# Patient Record
Sex: MALE | Race: WHITE
[De-identification: names, ages, dates, MRNs, and addresses within clinical notes are randomized per-mention and may not be internally consistent; named-entity substitution may affect disease eponyms.]

---

## 2019-01-19 NOTE — CT
CT CERVICAL SPINE:

 

Date:  01/19/19 

 

PROVIDED CLINICAL HISTORY:   

Injury. 

 

FINDINGS:

There is no evidence for fracture or traumatic subluxation. Advanced cervical degenerative changes ar
e seen with degenerative anterolisthesis of C3 on C4, and C7 on T1. No prevertebral soft tissue swell
ing apparent. Visualized lung apices appear clear. Carotid calcifications are seen. 

 

IMPRESSION: 

Advanced degenerative change without evidence for fracture or traumatic subluxation. 

 

 

POS: [facility]

## 2019-01-19 NOTE — CT
CT FACIAL BONES:

 

Date:  01/19/19 

 

PROVIDED CLINICAL HISTORY:   

Pain status post fall. 

 

FINDINGS:

Nondisplaced, mildly comminuted nasal bone fracture anteriorly right of midline. No additional fractu
re is evident. The paranasal sinuses demonstrate mild mucosal changes. The globes and other orbital c
ontents demonstrate no acute abnormality. No additional fracture is evident. 

 

IMPRESSION: 

Nasal bone fracture. 

 

 

POS: [facility]

## 2019-01-19 NOTE — HP
CHIEF COMPLAINT:  Confusion and imbalance.



HISTORY OF PRESENT ILLNESS:  The patient is a 78-year-old  male, who woke

up this morning and noticed that his balance is significantly off and he was

somewhat confused according to the family.  The family noticed that since

Thanksgiving, his mental function is kind of off and he has some confusion and this

is getting worse.  He did not have any numbness, tingling, weakness, headache, or

any other symptoms of stroke except for those above mentioned.  Apparently, he had a

stroke in the past couple of years ago, but he did not go to the doctor for that

problem since he said the doctor could not help him much, so he was brought to the

emergency room for further evaluation of this problem. 



PAST MEDICAL HISTORY:  

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

2. Hyperlipidemia.

3. Hypertension.



ALLERGIES:  NONE.



CURRENT MEDICATIONS:  Please refer to the medications list.



PAST SURGICAL HISTORY:  

1. Atrial fibrillation, status post ablation.

2. Left hip replacement.

3. Stenting of the descending aorta.



FAMILY HISTORY:  Father was 65 when he had lung cancer and mother  at the age of

88 of old age. 



SOCIAL HISTORY:  He used to smoke, but he quit a long time ago.  He drinks alcohol

daily, usually one scotch.  He denies any illicit drug use. 



REVIEW OF SYSTEMS:  All 14 systems were reviewed and they were negative except for

those symptoms mentioned in the HPI. 



PHYSICAL EXAMINATION:

GENERAL:  He is not in any distress during my visit. 

VITAL SIGNS:  His blood pressure is 170/104, pulse is 92, temperature is 97.6,

respiratory rate is 16, O2 saturation is 96% on room air. 

HEENT:  His head is posttraumatic.  He has a broken nose according to the CT of the

bony structures of the face.  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  The right eye shows

normal response to light.  Sclerae are nonicteric.  Conjunctivae reddish.  Oral

mucosa is moist. 

NECK:  Supple.  No lymphadenopathy. 

LUNGS:  Clear. 

HEART:  S1, S2, somewhat irregularly irregular.  No S3.  No S4. 

ABDOMEN:  Soft and nontender.  Bowel sounds are present.  No organomegaly. 

EXTREMITIES:  No clubbing, cyanosis, or edema. 

NEUROLOGICAL:  He follows my commands.  He is not in any distress.  There is no any

motor or sensory deficits present.  Cranial nerves are intact. 



LABORATORY DATA:  Labs showed white count of 9.2, hemoglobin 17.7, hematocrit 53.9.

INR 0.9.  PT 12.4, APTT 26.9.  Sodium 141, potassium 4.0, chloride 105, CO2 22, BUN

22, creatinine 1.18, calcium 10.7.  Troponin 0.011, second set 0.017, and the third

set is 0.031.  The rest of chemistry within normal limits.  Urinalysis showed 30 of

proteins and moderate amount of blood, 4-6 hyaline casts.  Brain CT showed no

evidence of intracranial hemorrhage or mass effect.  Chronic microvascular ischemic

changes.  The cervical spine CT showed advanced degenerative changes without

evidence of fracture or traumatic subluxation.  Chest x-ray did not show any

evidence of acute cardiopulmonary process. Facial bones CT showed nasal bone

fracture.  EKG showed atrial tachycardia.  No acute ischemic changes. 



IMPRESSION:  

1. Acute onset of imbalance, questionable cerebrovascular accident with normal CT so

far.  The patient received aspirin so far. 

2. Uncontrolled hypertension, most likely related to not being able to take his home

medications because of the current issue with his admission to the hospital. 

3. Atrial tachycardia.  We will consult Cardiology.  We will do echocardiogram.  We

will obtain MRI of the brain with and without contrast and carotid ultrasound. 



PLAN:  Plan is to admit him for observation.  Condition is fair.  Activity; bedrest

and bathroom privileges with assistance.  IV Hep-Lock.  We will repeat urinalysis

since he has some hematuria.  Current UA.  Get neurology consultation and reconcile

his home medications as soon as the list is available.  For now, we will continue

aspirin 325 mg daily and his surrogate decision maker is his son, Smith. 







Job ID:  [id number]

## 2019-01-19 NOTE — CT
CT BRAIN:

 

Date:  01/19/19 

 

PROVIDED CLINICAL HISTORY:   

Injury. 

 

FINDINGS:

 

Comparison with 02/10/16. 

 

The ventricular system appears normal in size and morphology. There is no evidence for intracranial h
emorrhage or mass effect. The chronic microvascular ischemic changes involving the cerebral white mat
ter appear similar to the prior study. The extracranial soft tissues and osseous structures demonstra
te an unremarkable CT appearance. 

 

IMPRESSION: 

No evidence for intracranial hemorrhage or mass effect. 

 

 

POS: [facility]

## 2019-01-19 NOTE — RAD
PORTABLE CHEST:

 

Date:  01/19/19

 

PROVIDED CLINICAL HISTORY:   

Weakness. 

 

FINDINGS:

 

No comparisons. 

 

The cardiac silhouette is within normal limits for portable technique. There is ectasia and tortuosit
y of the thoracic aorta with vascular calcification. No focal consolidation, pleural fluid, or pneumo
thorax apparent. 

 

IMPRESSION: 

No evidence for an acute cardiopulmonary process. 

 

 

POS: [facility]

## 2019-01-19 NOTE — ULT
BILATERAL CAROTID DUPLEX ULTRASOUND:

1/19/19

 

HISTORY: 

CVA.

 

FINDINGS:  

Gray scale, color flow, doppler evaluation, with spectral analysis of the bilateral carotid arteries 
is performed with 2D imaging. 

 

There is prominent calcified atherosclerotic plaque seen involving the proximal internal carotid nhung
zina bilaterally as well as the distal left common carotid artery. There is shadowing from the athero
sclerotic plaque which does limit evaluation of the proximal right internal carotid artery. Based on 
peak systolic velocity measurements in ICA/CCA ratios, there is less than 50% maximal stenosis in the
 bilateral internal carotid arteries. The peak systolic velocity in the right ICA is 77.6 cm/s with a
n ICA/CCA ratio of 0.97. Peak systolic velocity in the left ICA is 57 cm/s with an ICA/CCA ratio of 0
.54. 

 

Antegrade flow is demonstrated in the vertebral arteries bilaterally. 

 

IMPRESSION:  

No hemodynamically significant stenosis in the bilateral internal carotid arteries. However, there is
 shadowing atherosclerotic plaque involving the proximal right internal carotid artery which limits e
valuation. 

 

POS: [facility]

## 2019-01-20 NOTE — MRI
MRI BRAIN WITH AND WITHOUT IV CONTRAST:

 

Date:  01/20/19 

 

PROVIDED CLINICAL HISTORY:   

CVA, generalized weakness. 

 

FINDINGS:

The ventricular system is mildly prominent on the basis of central atrophy. There is generalized cere
bral volume loss. Prominent patchy areas of somewhat confluent signal alteration within the periventr
icular and subcortical cerebral white matter compatible with changes of chronic microvascular ischemi
a. There is no evidence for restricted diffusion to suggest recent infarction. There is no evidence f
or intracranial hemorrhage. Venous angioma is seen within the right cerebellar hemisphere. No additio
nal abnormal contrast enhancement is identified. Appropriate flow-voids are seen within the major int
racranial vessels. The extracranial soft tissues and calvarial marrow signal demonstrate no significa
nt abnormality. 

 

IMPRESSION: 

1.  No evidence for an acute intracranial abnormality. 

2.  Cerebral volume loss and prominent chronic microvascular ischemic change. 

 

 

POS: [facility]

## 2019-01-20 NOTE — PDOC.PN
- Subjective


Encounter Start Date: 01/20/19


Encounter Start Time: 18:30


Subjective: seen and examined with no new complaint





- Objective


Resuscitation Status - Order Detail:





01/19/19 17:10


Resuscitation Status Routine 


   Resuscitation Status: FULL: Full Resuscitation








Vital Signs & Weight: 


 Vital Signs (12 hours)











  Temp Pulse Resp BP BP BP BP


 


 01/20/19 15:49  97.9 F  88  16     127/76


 


 01/20/19 13:07       156/93 H  166/92 H


 


 01/20/19 11:54  98 F  92  16   134/89  


 


 01/20/19 09:22   87   143/84 H   


 


 01/20/19 07:51  98.2 F  87  16   143/84 H  














  BP Pulse Ox


 


 01/20/19 15:49   95


 


 01/20/19 13:07  161/93 H 


 


 01/20/19 11:54   97


 


 01/20/19 09:22  


 


 01/20/19 07:51   95








 Weight











Weight                         198 lb














I&O: 


 











 01/19/19 01/20/19 01/21/19





 06:59 06:59 06:59


 


Intake Total  1990 600


 


Output Total  250 


 


Balance  1740 600











Result Diagrams: 


 01/19/19 10:40





 01/19/19 10:40





Phys Exam





- Physical Examination


Constitutional: NAD


HEENT: PERRLA, moist MMs, sclera anicteric, TM's clear, oral pharynx no lesions


Neck: no nodes, no JVD, supple, full ROM


Respiratory: no wheezing, no rales, no rhonchi, clear to auscultation bilateral


Cardiovascular: RRR, no significant murmur, no rub


Gastrointestinal: soft, non-tender, no distention, positive bowel sounds


Musculoskeletal: pulses present





Dx/Plan


(1) Hypertension


Code(s): I10 - ESSENTIAL (PRIMARY) HYPERTENSION   Status: Acute   





(2) Syncope


Code(s): R55 - SYNCOPE AND COLLAPSE   Status: Acute   





(3) Dyslipidemia


Code(s): E78.5 - HYPERLIPIDEMIA, UNSPECIFIED   Status: Acute   





- Plan


PT/OT, 


Appreciate Neurology input


-: EEG planned for tommorrow


-: Cardiology evaluation of syncope pending





* .

## 2019-01-20 NOTE — CON
DATE OF CONSULTATION:  2019



CHIEF COMPLAINT:  Loss of consciousness.



HISTORY OF PRESENT ILLNESS:  The patient and his family members gave me his history.

 The patient does not have a full recollection of the events.  He remembers he got

out of bed and fell, and he remembers waking up when EMS picked him up.  He has had

complaints of memory problems at least for the last 2 years.  He had loss of vision

in the left eye following a stroke in 2017.  The patient has been confused

on and off.  He had dried blood on his face when the patient's family found him this

morning.  Face was in a pool of blood.  He did not feel right, but he thinks he fell

when he tried to get out of bed.  His balance was off.  He could not keep a

conversation, but he could not move.  He has had some balance problems for at least

a few months now and he had confusion during conversations as well.  He was on

statins in the past. 



PAST MEDICAL HISTORY:  The patient's previous medical history is positive for atrial

fibrillation with ablation in 2016 and previous history of CVA as stated

above with left-sided difficulty with vision problems.  Previous medical history

also includes hyperlipidemia and hypertension. 



ALLERGIES:  NO KNOWN DRUG ALLERGIES.



CURRENT MEDICATIONS:  

1. He takes Danielle as noted, but he is on aspirin twice daily at home.  He also takes.

2. Amlodipine.

3. Atorvastatin.

4. Hydrochlorothiazide.

5. Metoprolol.

6. Pantoprazole.

7. Cialis as needed.

8. Flomax.



PREVIOUS SURGICAL HISTORY:  Left hip replacement , ablation for atrial

fibrillation, 2016, descending aorta bifurcation stent . 



FAMILY HISTORY:  Father passed away at 65 and he had heart problems.  Mother  at

88.  They are not sure of cause of her death.  He has multiple siblings.  One sister

 from endometrial cancer.  Brother is 68 and has cancer and had liver

transplantation. 



SOCIAL HISTORY:  Was a smoker until .  He does drink one small nightcap at

night.  His son noted that the patient has being having more side effects with

alcohol recently and he feels drunk quicker.  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

psychologist. 



REVIEW OF SYSTEMS:  PULMONARY:  Negative for shortness of breath. 

CARDIAC:  Negative for chest pain or palpitations, but positive for atrial

fibrillation. 

GENITOURINARY:  Positive for increased frequency. 

NEUROLOGICAL:  Positive for memory loss and balance problems and this episode of

loss of consciousness. 

DERMATOLOGIC:  Negative. 

HEMATOLOGIC:  Negative for any bleeding diatheses. 

ENDOCRINE:  Negative for any diabetes or hyperglycemia.



LABORATORY WORKUP:  White count 9.2, hematocrit 53.9, .0, hemoglobin is 17.7.

 His chemistry, sodium 141, potassium 4.0, chloride 105, bicarb 22, BUN 22,

creatinine 1.18.  His CT scan of the brain was done yesterday on arrival to the

hospital, did not show any intracranial hemorrhage or mass effect.  He also had

cervical spine CT and facial bone CT.  Cervical spine CT shows advanced degenerative

changes and his facial bone CT shows a nasal bone fracture.  MRI of the brain was

completed today, which shows no evidence of acute intracranial abnormalities,

cerebral volume loss and prominent chronic microvascular ischemic changes.  Carotid

Dopplers, he has no stenosis in bilateral internal carotid arteries. 



PHYSICAL EXAMINATION:

GENERAL APPEARANCE:  Well-built, well-nourished man, who is comfortable in bed. 

VITAL SIGNS:  Blood pressure 143/84, pulse 87, temperature 98.2. 

CHEST:  Clear vesicular breathing. 

CARDIOVASCULAR:  No murmurs. 

ABDOMEN:  Soft. 

NEUROLOGICAL EXAMINATION:  He has higher intellectual functions.  He is oriented to

time, place, and person.  Appropriate conversation and has a good sense of humor. 

CRANIAL NERVE EXAMINATION:  Mild facial asymmetry on the left side with a facial

droop, could be old and mild deviation of the tongue to the left.  Normal

extraocular movements.  Pupils are reactive to light bilaterally.  Sensory, normal

sensation of face bilaterally and tongue midline.  Normal elevation of palate.

Hearing is normal.  Motor, bulk normal, tone normal, strength 5/5 in upper and lower

extremities in iliopsoas, hamstrings, quadriceps, ankle dorsiflexion and plantar

flexion and deltoid biceps, triceps, wrist extension and flexion and finger

extension and flexion and he had mild pronator drift on the right. 

CEREBELLAR EXAMINATION:  He has mild dysmetria in the right lower extremity, and

sensory normal to touch and proprioception.  Gait not tested. 



IMPRESSION:  The patient is a 78-year-old man who woke up on the floor.  He does not

have a recollection of how he got there.  All he remembers is he got out of bed and

he fell, hit his head.  His face was in a pool of blood per family when they found

him.  He was talking.  He could not move his extremities and he was brought to the

emergency room yesterday.  So far, his workup did not show any acute infarct.  MRI

scan shows chronic microvascular ischemic changes and his risk factor for stroke is

atrial fibrillation.  His examination currently is normal, although there is some

asymmetry with his facial musculature and also mild cerebellar findings, which could

be old.  At this time, it is unclear what kind of event he had overnight.  He may

have had orthostatic hypotension, which needs to be looked into as well carefully

and whether this was a syncopal event, it is unclear.  He may also have had a

seizure, but no one witnessed this event. 



RECOMMENDATIONS:  

1. Even if it is a first seizure, we do not need to medicate him for seizure

disorder. 

2. I will request an EEG.

3. Please consult Cardiology.  I will request Neurology to follow up as needed.

Please complete his stroke workup including echocardiogram.  He will probably need

full-dose aspirin from now on for stroke prophylaxis. 







Job ID:  [id number]

## 2019-01-21 NOTE — CON
DATE OF CONSULTATION:  2019



REFERRING PHYSICIAN:  Angela Parker MD



HISTORY OF PRESENT ILLNESS:  I am seeing Mr. Alberto at our [facility] Telemetry Floor as an electrophysiology consult.  His problems are: 

1. Acute syncopal spell.

    a. Remote history of syncope in the past as well with LINQ recorder in place.

b. LINQ interrogation does not reveal significant tachybrady arrhythmia at the time

of the syncopal spell on the episodic atrial fibrillation with RVR is seen up to 180

to 190 beats per minute. 

    c. A 4-second pause noted in the past, not correlating to the time of symptoms.

2. Atrial arrhythmias.

    a. Status post pulmonary venous central isolation in 2015.

    b. Prior history of cardioversion in 2017.

3. History of stroke.

    a. Subsequent left eye blindness.

b. Status post Watchman device placement in 2016 and subsequent MICHAEL in

2017 with only 2 mm leak now off anticoagulants. 

4. Cardiomyopathy.

a. Reduced LVEF at 35% to 40% noted on echo on 2019.  Previous LVEF 50%

to 55% on echo in 2016. 

5. History of hypertension.



ALLERGIES:  NONE NOTED.



MEDICATIONS:  At home included;

1. Tamsulosin.

2. Amlodipine/Lipitor.

3. Omeprazole.

4. Hydrochlorothiazide.

5. Potassium.

6. Aspirin.

7. Tadalafil.

8. Lipitor.

9. Metoprolol.



SUBJECTIVE:  Mr. Alberto is here after a syncopal spell.  He states he walked to

the bathroom at night and he fell down.  They were concerned about a new stroke and

was transferred to the hospital.  He underwent multiple evaluation including MRIs,

but no definite evidence of new infarct was found.  On the other hand, he was noted

to be in irregular atrial rhythm. 



Currently, he is denying dizziness, loss of consciousness.  No stroke-like symptoms.

 No neurological deficits.  No fever, chills, or cough.  Denies angina.  No bleeding

issues are noted. 



Upon further questioning, it seems that he had issues with balance and somewhat

confused also recently. 



The rest of 12-point system otherwise unremarkable.



PAST MEDICAL HISTORY:  History of CVA with left-sided eye vision deficits,

hyperlipidemia, and hypertension and as noted above. 



PAST SURGICAL HISTORY:  Significant for ablation, left hip replacement, stenting of

the descending aorta is noted. 



SOCIAL HISTORY:  The patient denies drug use, but he does drink alcohol daily.  Uses

scotch.  He used to smoke, but quit long time ago. 



FAMILY HISTORY:  Not contributory.  Mother  at age of 88 of old age.  His father

had lung cancer and  at 65. 



OBJECTIVE DATA:  VITAL SIGNS:  Blood pressure is 156/90, heart rate 91, respirations

20, temperature 97 degrees Fahrenheit.  Orthostatic blood pressures reveal no

significant orthostasis yesterday.  Temperature 97.9 degrees Fahrenheit. 

GENERAL:  Alert and oriented man, in no apparent distress. 

NECK:  Supple.  Jugular veins not distended. 

CHEST:  Coarse without crackles. 

HEART:  Sounds are irregular.  S1 and S2 are variable.  No murmur or gallop. 

ABDOMEN:  Benign.  Bowel sounds positive. 

EXTREMITIES:  Lower extremities without edema, clubbing, or cyanosis.  Pulses are

adequate. 

NEUROLOGIC:  The patient is nonfocal. 

MUSCULOSKELETAL:  No joint swelling or deformity. 

SKIN:  Without rash.



DATABASE:  EKG is reviewed revealing an atrial flutter, atypical with variable AV

conduction.  We reviewed the LINQ recorder interrogation thus far today, which

revealed episodes of tachyarrhythmia up to 190 beats per minute, short episodes but

also 4-second pauses at times.  Extensively, this is not correlating to the

patient's symptoms. 



LABORATORY DATA:  White cell count is 9.2, hemoglobin 17.7, platelet count is 202.

Sodium 141, potassium 4, BUN is 22, creatinine 1.18.  INR 0.9. 



ASSESSMENT AND PLAN:  Mr. Alberto is a pleasant 78-year-old man with prior history

of atrial arrhythmias, prior ablation and also Watchman device placement.  He has

remote history of stroke, currently admitted with syncopal spell with also episode

of dizziness and some mental confusion is also noted recently.  He seems to have

persistent atrial arrhythmia. 



We discussed these findings.  The lack of extreme tachy-emory arrhythmia at the time

of his index event makes it likely to be more blood pressure than arrhythmia

related.  On the other hand, he did also have episodes of slow heart beating with no

specific symptoms as noted above.  Restoring sinus rhythm could be a good

consideration with the cardioversion.  At this point, he would like to hold off on

that and possibly consider it as an outpatient. 



Atrial arrhythmia/atypical atrial flutter, currently off anticoagulation, hence

Watchman device in place.  At this point, I would not resume anticoagulation hence

risk of fall and history of adequate Watchman placement. 



Cardiomyopathy, newly found the reason for that unclear.  Standard cardiac heart

failure therapy as per Dr. Garcia and partners are recommended. 



Should the LVEF improve, but worsens less than 35%, ICD therapy could be a

consideration. 



Thank you again for allowing me to participate in the care of this patient.  We will

follow with you. 







Job ID:  [id number]

## 2019-01-21 NOTE — EEG
*******************************************************************************
********************************************************************************
*****

Referring Physician: TERESA MONCADA   

 *******************************************************************************
********************************************************************************
*****

EEG # [id number]

TEST TYPE: ROUTINE PORTABLE INPATIENT



REPORT:



AN EEG USING THE INTERNATIONAL TEN-TWENTY SYSTEM OF ELECTRODE PLACEMENT WAS 
PERFORMED.



The waking background is a low amplitude 9 hertz alpha frequency.  The patient 
remained awake throughout the study.

Photic stimulation was unremarkable.  No epileptiform features were seen.







IMPRESSION:



THIS IS A NORMAL AWAKE EEG.





Technician: CHIO

: EEG.MORENA TREVINO

## 2019-01-21 NOTE — PDOC.PN
- Subjective


Encounter Start Date: 01/21/19


Encounter Start Time: 16:58





Patient lying in bed, reports feeling better today. No syncopal like episode. 

No chest pain or shortness of breath. Still in A fib/flutter on monitor 

currently rate controlled.





- Objective


Resuscitation Status - Order Detail:





01/19/19 17:10


Resuscitation Status Routine 


   Resuscitation Status: FULL: Full Resuscitation








MAR Reviewed: Yes


Vital Signs & Weight: 


 Vital Signs (12 hours)











  Temp Pulse Pulse Pulse Pulse Pulse Resp


 


 01/21/19 15:13  98.3 F  90      24 H


 


 01/21/19 11:54  97.1 F L  91      20


 


 01/21/19 09:42   80     


 


 01/21/19 09:05    89  89  91  91 


 


 01/21/19 07:54  98.3 F  80      16














  BP BP BP BP BP Pulse Ox


 


 01/21/19 15:13      118/72  96


 


 01/21/19 11:54      156/90 H  95


 


 01/21/19 09:42      


 


 01/21/19 09:05  142/92 H  142/95 H  170/116 H  184/97 H  


 


 01/21/19 07:54      141/89 H  98








 Weight











Weight                         194 lb 12.8 oz














I&O: 


 











 01/20/19 01/21/19 01/22/19





 06:59 06:59 06:59


 


Intake Total 1990 2220 360


 


Output Total 250 950 


 


Balance 1740 1270 360











Result Diagrams: 


 01/19/19 10:40





 01/21/19 14:44


Radiology Reviewed by me: Yes


EKG Reviewed by me: Yes





Phys Exam





- Physical Examination


Constitutional: NAD


HEENT: PERRLA, moist MMs, oral pharynx no lesions


Neck: no nodes, no JVD, supple


Respiratory: no wheezing, no rales, no rhonchi, clear to auscultation bilateral


Cardiovascular: no significant murmur, no rub


A fib/flutter


Gastrointestinal: soft, non-tender


Musculoskeletal: no edema, pulses present


Neurological: non-focal, normal sensation, moves all 4 limbs


Lymphatic: no nodes


Psychiatric: normal affect, A&O x 3


Skin: no rash, normal turgor, cap refill <2 seconds





Dx/Plan


(1) A-fib


Code(s): I48.91 - UNSPECIFIED ATRIAL FIBRILLATION   Status: Acute   





(2) Dyslipidemia


Code(s): E78.5 - HYPERLIPIDEMIA, UNSPECIFIED   Status: Acute   





(3) Hypertension


Code(s): I10 - ESSENTIAL (PRIMARY) HYPERTENSION   Status: Acute   





(4) Syncope


Code(s): R55 - SYNCOPE AND COLLAPSE   Status: Acute   





- Plan


cont current plan of care, DVT proph w/lovenox





* Cardiology following, plan for cardioversion tomorrow


* Continue medical management


* No syncopal like episode, EEG normal


* Monitor vital and labs and further management pending cardioversion tomorrow

## 2019-01-22 NOTE — PQF
CLINICAL DOCUMENTATION IMPROVEMENT CLARIFICATION FORM:  ICD-10 Updated



PLEASE DO AN ADDENDUM TO THE PROGRESS NOTE WITH ANY DOCUMENTATION UPDATES OR 
ADDITIONS AND CARRY THROUGH TO DC SUMMARY.   THANK YOU.



DATE:       1/22/19                                                   ATTN:  NARESH EDWARD





Please exercise your independent, professional judgment in responding to the 
clarification form. 

Clinical indicators are provided on the bottom of this form for your review





Please check appropriate box(s) to clarify if the following diagnosis has been 
ruled in or  ruled out:  CVA





[  ] Ruled in diagnosis

     [  ] Continue to treat        [  ] Resolved

[ x ] Ruled out diagnosis

[  ] Other diagnosis ___________

[  ] Unable to determine



In addition, please specify:

Present on Admission (POA):  [ x ] Yes             [  ] No             [  ] 
Unable to determine



For continuity of documentation, please document condition throughout progress 
notes and discharge summary.  Thank You.



CLINICAL INDICATORS - SIGNS / SYMPTOMS / LABS



ER NOTE: "CONFUSION, FALL, WEAKNESS"

DX: "RULE OUT CVA"



H&P: "ACUTE ONSET OF IMBALANCE, QUESTIONABLE CVA"



RISKS:

AFIB

H/O CVA

ADVANCED AGE

UNCONTROLLED HTN



TREATMENT:

ASPIRIN (ER-PRESENT)

EEG

CT BRAIN

NEUROLOGY CONSULT

BRAIN MRI





[employer] Crystal Reports Winform Viewer



(This form is maintained as a part of the permanent medical record)

 2015 [employer].  All Rights Reserved

RAMILA [email or web address]    Office:  [phone]

                                                              

 

[facility]

## 2019-01-22 NOTE — PDOC.PN
- Subjective


Encounter Start Date: 01/22/19


Encounter Start Time: 16:49





Patient lying in bed this morning with son at bedside, he denies chest pain or 

shortness of breath, but reported some dizziness this morning. He went for 

cardioversion today, which was successful. Once he returned he had a large 

wattery BM.





- Objective


Resuscitation Status - Order Detail:





01/19/19 17:10


Resuscitation Status Routine 


   Resuscitation Status: FULL: Full Resuscitation








MAR Reviewed: Yes


Vital Signs & Weight: 


 Vital Signs (12 hours)











  Temp Pulse Resp BP BP BP Pulse Ox


 


 01/22/19 16:30   95   143/84 H   


 


 01/22/19 15:55  97.7 F  95  20   129/69   97


 


 01/22/19 05:00  98.2 F  92  20    135/81  95








 Weight











Weight                         194 lb 12.8 oz














I&O: 


 











 01/21/19 01/22/19 01/23/19





 06:59 06:59 06:59


 


Intake Total 2220 1120 


 


Output Total 950  


 


Balance 1270 1120 











Result Diagrams: 


 01/19/19 10:40





 01/21/19 14:44


Radiology Reviewed by me: Yes





Phys Exam





- Physical Examination


Constitutional: NAD


HEENT: PERRLA, moist MMs, oral pharynx no lesions


Neck: no nodes, no JVD, supple


Respiratory: no wheezing, no rales, no rhonchi, clear to auscultation bilateral


Cardiovascular: RRR, no significant murmur, no rub


Gastrointestinal: soft, non-tender, positive bowel sounds


Musculoskeletal: no edema, pulses present


Neurological: non-focal, normal sensation, moves all 4 limbs


Lymphatic: no nodes


Psychiatric: normal affect, A&O x 3


Skin: no rash, normal turgor, cap refill <2 seconds





Dx/Plan


(1) A-fib


Code(s): I48.91 - UNSPECIFIED ATRIAL FIBRILLATION   Status: Acute   





(2) Dyslipidemia


Code(s): E78.5 - HYPERLIPIDEMIA, UNSPECIFIED   Status: Acute   





(3) Hypertension


Code(s): I10 - ESSENTIAL (PRIMARY) HYPERTENSION   Status: Acute   





(4) Syncope


Code(s): R55 - SYNCOPE AND COLLAPSE   Status: Acute   





- Plan


cont current plan of care, plan discussed w/ family, DVT proph w/lovenox





* Continue medical management


* Patient had successful cardioversion


* Await stool culture to rule out c diff


* Continue other home medications

## 2019-01-22 NOTE — OP
ELECTRICAL CARDIOVERSION:

 

HISTORY: 

This is a 78-year-old gentleman with proximal atrial flutter.

 

DESCRIPTION OF PROCEDURE:

The patient was taken to the PACU.  The patient was sedated by anesthesiology.  
The patient was shocked with 100 joules of synchronized electricity.  The 
patient converted to normal sinus rhythm.

 

IMPRESSION: 

Successful electrocardioversion.

BELINDA

## 2019-01-22 NOTE — ECHO
TRANSESOPHAGEAL ECHOCARDIOGRAM:

 

HISTORY: 

This is a 78-year-old gentleman with proximal atrial flutter. 

 

PROCEDURE: 

The patient was taken to the PACU.  The patient was sedated by anesthesiology and transesophageal pro
be was placed to the distal esophagus and stomach.  Echocardiographic images were obtained and the tr
ansesophageal probe was removed.

 

FINDINGS: 

1.  Normal left ventricular systolic function.

2.  Normal mitral and aortic valves.

3.  Mild mitral regurgitation.

4.  Mild tricuspid regurgitation.

5.  The left atrial occluder device is well positioned with a 0.2 cm leak noted.

6.  Atherosclerotic debris in the descending aorta.

 

IMPRESSION: 

Occluder device with a small 0.2 cm leak noted.

 

POS: [facility]

## 2019-01-23 NOTE — PDOC.PN
- Subjective


Encounter Start Date: 01/23/19


Encounter Start Time: 15:05


Subjective: minimal diarrhea





- Objective


Resuscitation Status - Order Detail:





01/19/19 17:10


Resuscitation Status Routine 


   Resuscitation Status: FULL: Full Resuscitation








MAR Reviewed: Yes


Vital Signs & Weight: 


 Vital Signs (12 hours)











  Temp Pulse Resp BP BP BP Pulse Ox


 


 01/23/19 11:41  98.4 F  72  20   94/50 L   95


 


 01/23/19 09:32   77   98/54 L   


 


 01/23/19 08:57     98/54 L   


 


 01/23/19 08:50        97


 


 01/23/19 07:33  98.3 F  70  18   96/54 L   97


 


 01/23/19 03:44  98.3 F  80  18    92/52 L  93 L








 Weight











Weight                         194 lb 12.8 oz














I&O: 


 











 01/22/19 01/23/19 01/24/19





 06:59 06:59 06:59


 


Intake Total 1120 1875 240


 


Balance 1120 1875 240











Result Diagrams: 


 01/19/19 10:40





 01/21/19 14:44





Phys Exam





- Physical Examination


Neck: no JVD


Respiratory: clear to auscultation bilateral


Cardiovascular: RRR, no significant murmur


Gastrointestinal: soft, non-tender, positive bowel sounds


Musculoskeletal: no edema





Dx/Plan


(1) Atrial flutter


Code(s): I48.92 - UNSPECIFIED ATRIAL FLUTTER   Status: Acute   


Qualifiers: 


   Atrial flutter type: atypical   Qualified Code(s): I48.4 - Atypical atrial 

flutter   





(2) Dyslipidemia


Code(s): E78.5 - HYPERLIPIDEMIA, UNSPECIFIED   Status: Suspected   





(3) Hypertension


Code(s): I10 - ESSENTIAL (PRIMARY) HYPERTENSION   Status: Chronic   


Qualifiers: 


   Hypertension type: essential hypertension   Qualified Code(s): I10 - 

Essential (primary) hypertension   





(4) Syncope


Code(s): R55 - SYNCOPE AND COLLAPSE   Status: Acute   


Qualifiers: 


   Encounter type: initial encounter 





- Plan


post cardioversion


-: BP under 100 sys, HCTZ  held, hold amlodipine





* .

## 2019-01-23 NOTE — PDOC.CTH
Cardiology Progress Note





- Subjective





EP PROGRESS NOTE: 1/23/19





Seen as followup for atrial arrhythmias, cardiomyopathy, and bradycardia. 

Feeling well today/does not voice any concerns or complaints. 





- Objective


 Vital Signs











  Temp Pulse Resp BP BP BP Pulse Ox


 


 01/23/19 15:06  97.1 F L  65  12    84/52 L  95


 


 01/23/19 11:41  98.4 F  72  20   94/50 L   95


 


 01/23/19 09:32   77   98/54 L   


 


 01/23/19 08:57     98/54 L   


 


 01/23/19 08:50        97


 


 01/23/19 07:33  98.3 F  70  18   96/54 L   97








 











Weight                         194 lb 12.8 oz














 











 01/22/19 01/23/19 01/24/19





 06:59 06:59 06:59


 


Intake Total 1120 1875 240


 


Balance 1120 1875 240














- Physical Examination


General/Neuro: NAD, other: (alert, oriented to person and place. )


Neck: carotid US brisk, no JVD present


Lungs: CTA, unlabored respirations


Heart: PMI normal, RRR


Abdomen: NT/ND, soft





- Labs


Result Diagrams: 


 01/19/19 10:40





 01/21/19 14:44


 Troponin/CKMB











Troponin I  0.031 ng/mL (< 0.028)  H  01/19/19  16:40    














- Assessment/Plan





1. Syncope and collapse


   -no arrhythmias correlated with syncopal episodes. Likely orthostatic issue


2. Atrial arrhythmias


   -No need for OAC as watchman has been placed. 


   -Increase betablocker therapy as tolerated. 


3. Cardiomyopathy


   -newly diagnosed. etiology unknown


   - medical management x 3 months then re-evaluate EF. if remains <35%, 

consider ICD


4. Hypotension

## 2019-01-23 NOTE — PDOC.EVN
Event Note





- Event Note


Event Note: 





BP still 92 sys supine. HCTZ and amlodipine DCed. will give D5 1/2n at 125  ml/

hr

## 2019-01-23 NOTE — DIS
DATE OF ADMISSION:  01/19/2019



DATE OF DISCHARGE:  01/23/2019



PRIMARY CARE PROVIDER:  Dr. Sarbjit Birmingham.



FINAL DIAGNOSES:  

1. Syncope.

2. Atypical atrial flutter.

3. Hypertension.

4. Post cardioversion.

5. Dyslipidemia.



DISCHARGE MEDICATIONS:  

1. Omeprazole 20 mg a day.

2. Amlodipine and atorvastatin 5/40 one tablet a day.

3. Hydrochlorothiazide 25 mg a day.

4. Flomax 0.4 mg daily.

5. Metoprolol 25 mg twice a day.

6. Lisinopril 20 mg a day.

7. Aspirin 325 mg a day.



ALLERGIES:  NO KNOWN DRUG ALLERGIES.



PENDING AT THE TIME OF DISCHARGE:  Nothing.



CODE STATUS:  Full.



DIET:  Heart healthy.



HOSPITAL COURSE:  [facility]

through [facility] with a history of confusion and imbalance.

He was brought to the emergency room for evaluation.  His brain CT, no evidence for

acute intracranial event.  Carotid Doppler showed no stenosis.  Consultation was

obtained with Neurology, Dr. Angela Parker.  Echocardiogram was done, which showed

an EF of about 35% to 40%.  EEG was done, which showed no seizure activity.  The

patient was seen in consultation by Dr. Eugene Arzola for atrial tachycardia and

atrial flutter.  The patient was taken to the cath lab by Dr. Lui Laurent.

Transesophageal echocardiogram revealed a left atrial occluder device well

positioned.  The patient underwent DC cardioversion into regular sinus rhythm.  The

patient is currently in regular sinus rhythm.  The patient had an episode of

profound diarrhea yesterday.  The laboratory workup for that reveals no abnormality

on stool culture, Campylobacter assay or C diff.  The patient is in regular sinus

rhythm.  Cardio respiratory exam is normal.  He is being discharged for followup

with his PCP in 1 week and to have followup arranged with Dr. Laurent. 



MEDICATIONS:  Medications added during his hospital stay;

1. Aspirin 325 mg a day.

2. Lisinopril.

3. Metoprolol.



LABORATORY DATA:  Current laboratory on 01/21, his lytes are balanced.  On

admission, CBC showed a mild macrocytosis, otherwise unremarkable. 







Job ID:  [id number]

## 2019-01-24 NOTE — DIS
DATE OF ADMISSION:  01/19/2019



DATE OF DISCHARGE:  



TRANSFER OF CARE:  



ADDENDUM:  This is an addendum to the discharge summary dictated yesterday.  Mr. Alberto's discharge was delayed one day due to low blood pressure.  His amlodipine

was discontinued.  He is being discharged on the same medicines as listed before,

except with the discontinuation of the amlodipine/Lipitor combination.  A

prescription has been written for Lipitor 40.  At the time of discharge, his vital

signs are stable.  He is alert and oriented.  Physical examination is stable.  He is

to see Dr. Sarbjit Birmingham in 7 days, for followup, Lui Laurent, Cardiology in 2 to 3

weeks.  The remainder of the discharge summary is as dictated. 







Job ID:  [id number]

## 2019-01-26 NOTE — EKG
Test Reason : STROKE-LIKE SYMPTOMS

Blood Pressure : ***/*** mmHG

Vent. Rate : 072 BPM     Atrial Rate : 089 BPM

   P-R Int : 000 ms          QRS Dur : 104 ms

    QT Int : 410 ms       P-R-T Axes : 000 -23 -07 degrees

   QTc Int : 448 ms

 

Controlled  Atrial fibrillation vs rate ?

Undetermined rhythm

Otherwise normal ECG

 

Confirmed by IMAN BROOKS DO ([id number]),  MONA PHAM ([id number]) on 1/26/2019 9:32:58 PM

 

Referred By:             Confirmed By:IMAN BROOKS DO

## 2020-08-30 ENCOUNTER — HOSPITAL ENCOUNTER (EMERGENCY)
Dept: HOSPITAL 92 - ERS | Age: 80
Discharge: HOME | End: 2020-08-30
Payer: MEDICARE

## 2020-08-30 DIAGNOSIS — Z79.899: ICD-10-CM

## 2020-08-30 DIAGNOSIS — E78.00: ICD-10-CM

## 2020-08-30 DIAGNOSIS — E78.5: ICD-10-CM

## 2020-08-30 DIAGNOSIS — Z86.73: ICD-10-CM

## 2020-08-30 DIAGNOSIS — F03.90: ICD-10-CM

## 2020-08-30 DIAGNOSIS — I10: ICD-10-CM

## 2020-08-30 DIAGNOSIS — R09.89: Primary | ICD-10-CM

## 2020-08-30 DIAGNOSIS — Z79.82: ICD-10-CM

## 2020-08-30 PROCEDURE — 99283 EMERGENCY DEPT VISIT LOW MDM: CPT

## 2020-09-29 ENCOUNTER — HOSPITAL ENCOUNTER (OUTPATIENT)
Dept: HOSPITAL 92 - RAD | Age: 80
Discharge: HOME | End: 2020-09-29
Attending: PHYSICIAN ASSISTANT
Payer: MEDICARE

## 2020-09-29 DIAGNOSIS — K21.9: ICD-10-CM

## 2020-09-29 DIAGNOSIS — R13.10: Primary | ICD-10-CM

## 2020-09-29 DIAGNOSIS — K22.8: ICD-10-CM

## 2020-09-29 DIAGNOSIS — K22.2: ICD-10-CM

## 2020-09-29 PROCEDURE — 74220 X-RAY XM ESOPHAGUS 1CNTRST: CPT

## 2020-09-29 NOTE — RAD
Esophagram air contrast



HISTORY: Dysphagia.



FINDINGS: Air contrast and single column barium evaluation was performed. There is normal anatomic ap
pearance of the esophagus. No significant hiatal hernia visualized fluoroscopically.



Upon administration of a 12 mm barium tablet, there was holdup at the lower esophageal sphincter for 
approximately 15 minutes as the tablet dissolved. During that time, there was mild backup of

contrast above the tablet. Prominent nonpropulsive tertiary type contractions. Diminished primary and
 secondary peristalsis.



With the patient supine, contrast entered the esophagus along its length.



  



IMPRESSION :

Narrowing/stricture at the lower esophageal sphincter, resulting in significant holdup of a 12 mm bar
ium tablet.



Moderate amount of gastroesophageal reflux. Severe presbyesophagus.



Reported By: ROSA Navarro 

Electronically Signed:  9/29/2020 2:07 PM

## 2020-12-21 ENCOUNTER — HOSPITAL ENCOUNTER (EMERGENCY)
Dept: HOSPITAL 92 - ERS | Age: 80
Discharge: HOME | End: 2020-12-21
Payer: MEDICARE

## 2020-12-21 DIAGNOSIS — E78.5: ICD-10-CM

## 2020-12-21 DIAGNOSIS — Z79.899: ICD-10-CM
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
## 2020-12-21 DIAGNOSIS — E78.00: ICD-10-CM

## 2020-12-21 DIAGNOSIS — S51.811A: Primary | ICD-10-CM

## 2020-12-21 DIAGNOSIS — I10: ICD-10-CM

## 2020-12-21 DIAGNOSIS — Z79.82: ICD-10-CM

## 2020-12-21 PROCEDURE — 93005 ELECTROCARDIOGRAM TRACING: CPT

## 2020-12-21 PROCEDURE — 90471 IMMUNIZATION ADMIN: CPT

## 2020-12-21 PROCEDURE — 70450 CT HEAD/BRAIN W/O DYE: CPT

## 2020-12-21 PROCEDURE — 12004 RPR S/N/AX/GEN/TRK7.6-12.5CM: CPT

## 2020-12-21 PROCEDURE — 90715 TDAP VACCINE 7 YRS/> IM: CPT

## 2020-12-21 NOTE — RAD
2 views of the right forearm:

12/21/2020



COMPARISON: None



HISTORY: Fall, trauma, pain



FINDINGS: There appears to be a significant soft tissue injury involving the proximal forearm just di
stal to the level of the elbow, best seen on image 2 of 2. No radiopaque foreign body. No acute

fracture or evidence of dislocation. There is radiocarpal joint space narrowing.



IMPRESSION: No acute fracture or dislocation.



Reported By: Jonathan Villanueva 

Electronically Signed:  12/21/2020 9:13 AM

## 2020-12-21 NOTE — CT
Head CT without contrast

12/21/2020:



COMPARISON: 2/10/2016



HISTORY: Injury, trauma, pain, altered mental status



TECHNIQUE: Axial CT imaging at 5 mm intervals from vertex through skull base without contrast



FINDINGS: The visualized paranasal sinuses and mastoid air cells are well-aerated. There is no displa
bernice calvarial fracture.



No intracranial hemorrhage, midline shift, or mass effect is noted.



There is stable significant diffuse cerebral volume loss and there is stable periventricular, deep, a
nd subcortical white matter hypodensity, evidence of small vessel disease.



IMPRESSION: Cerebral volume loss and small vessel disease. No intracranial hemorrhage or displaced ca
lvarial fracture.



Reported By: Jonathan Villanueva 

Electronically Signed:  12/21/2020 8:36 AM